# Patient Record
Sex: MALE | Race: WHITE
[De-identification: names, ages, dates, MRNs, and addresses within clinical notes are randomized per-mention and may not be internally consistent; named-entity substitution may affect disease eponyms.]

---

## 2017-05-12 ENCOUNTER — HOSPITAL ENCOUNTER (OUTPATIENT)
Dept: HOSPITAL 47 - RADUSWWP | Age: 57
Discharge: HOME | End: 2017-05-12
Payer: COMMERCIAL

## 2017-05-12 DIAGNOSIS — N63: Primary | ICD-10-CM

## 2017-06-10 ENCOUNTER — HOSPITAL ENCOUNTER (OUTPATIENT)
Dept: HOSPITAL 47 - LABWHC1 | Age: 57
Discharge: HOME | End: 2017-06-10
Payer: COMMERCIAL

## 2017-06-10 DIAGNOSIS — Z13.9: ICD-10-CM

## 2017-06-10 DIAGNOSIS — Z00.00: Primary | ICD-10-CM

## 2017-06-10 LAB
ALP SERPL-CCNC: 70 U/L (ref 38–126)
ALT SERPL-CCNC: 27 U/L (ref 21–72)
ANION GAP SERPL CALC-SCNC: 6 MMOL/L
AST SERPL-CCNC: 22 U/L (ref 17–59)
BUN SERPL-SCNC: 15 MG/DL (ref 9–20)
CALCIUM SPEC-MCNC: 9.6 MG/DL (ref 8.4–10.2)
CH: 33.6
CHCM: 34.3
CHLORIDE SERPL-SCNC: 106 MMOL/L (ref 98–107)
CHOLEST SERPL-MCNC: 139 MG/DL (ref ?–200)
CO2 SERPL-SCNC: 26 MMOL/L (ref 22–30)
ERYTHROCYTE [DISTWIDTH] IN BLOOD BY AUTOMATED COUNT: 5.06 M/UL (ref 4.3–5.9)
ERYTHROCYTE [DISTWIDTH] IN BLOOD: 13.3 % (ref 11.5–15.5)
GLUCOSE SERPL-MCNC: 92 MG/DL (ref 74–99)
HCT VFR BLD AUTO: 49.6 % (ref 39–53)
HDLC SERPL-MCNC: 39 MG/DL (ref 40–60)
HDW: 2.33
HEPATITIS C VIRUS IGG  INDEX: 0.02
HGB BLD-MCNC: 16.9 GM/DL (ref 13–17.5)
MCH RBC QN AUTO: 33.4 PG (ref 25–35)
MCHC RBC AUTO-ENTMCNC: 34 G/DL (ref 31–37)
MCV RBC AUTO: 98.1 FL (ref 80–100)
NON-AFRICAN AMERICAN GFR(MDRD): >60
POTASSIUM SERPL-SCNC: 4.7 MMOL/L (ref 3.5–5.1)
PROT SERPL-MCNC: 6.9 G/DL (ref 6.3–8.2)
PSA SERPL-MCNC: 0.67 NG/ML (ref 0–4)
SODIUM SERPL-SCNC: 138 MMOL/L (ref 137–145)
TRIGL SERPL-MCNC: 69 MG/DL (ref ?–150)
WBC # BLD AUTO: 6.2 K/UL (ref 3.8–10.6)

## 2017-06-10 PROCEDURE — 84153 ASSAY OF PSA TOTAL: CPT

## 2017-06-10 PROCEDURE — 36415 COLL VENOUS BLD VENIPUNCTURE: CPT

## 2017-06-10 PROCEDURE — 80053 COMPREHEN METABOLIC PANEL: CPT

## 2017-06-10 PROCEDURE — 80061 LIPID PANEL: CPT

## 2017-06-10 PROCEDURE — 85027 COMPLETE CBC AUTOMATED: CPT

## 2017-06-10 PROCEDURE — 86803 HEPATITIS C AB TEST: CPT

## 2019-04-11 NOTE — USB
Reason for exam: clinical finding.



History:

Family history of breast cancer in sister at age 59.

Indicated problem(s): lump or thickening in the right breast.



Physical Findings:

Nurse Summary: 1cm round movable nodules in the right breast at 12 o'clock, 0.5cm 

in the right breast at 11 o'clock (nurse mm).



US Breast RT

Right breast ultrasound includes all four quadrants, the retroareolar region and 

axilla. Finding demonstrates a 1.2 x 1.6 x 0.5cm oval, hyperechoic, vascular 

lesion at BB at 12 o'clock, a 0.9 x 0.5 x 0.4cm oval, hyperechoic, vascular lesion

at 11 o'clock and a 0.6 x 0.7 x 0.3cm oval, hyperechoic, vascular lesion at 11 

o'clock. Consistent with lipomas.



These results were verbally communicated with the patient and result sheet given 

to the patient on 5/12/17.





ASSESSMENT: Benign, BI-RAD 2



RECOMMENDATION:

Clinical management of the right breast.

Manage patient on a clinical basis.
Well-developed, well nourished

## 2022-05-05 ENCOUNTER — HOSPITAL ENCOUNTER (OUTPATIENT)
Dept: HOSPITAL 47 - RADMRIMAIN | Age: 62
Discharge: HOME | End: 2022-05-05
Attending: FAMILY MEDICINE
Payer: COMMERCIAL

## 2022-05-05 DIAGNOSIS — M43.17: Primary | ICD-10-CM

## 2022-05-05 DIAGNOSIS — M51.36: ICD-10-CM

## 2022-05-05 PROCEDURE — 72148 MRI LUMBAR SPINE W/O DYE: CPT

## 2022-05-05 NOTE — MR
EXAMINATION TYPE: MR lumbar spine wo con

 

DATE OF EXAM: 5/5/2022

 

COMPARISON: None

 

HISTORY: Low back pain into rt leg

 

TECHNIQUE: 

Multiplanar, multisequence images of the lumbar spine were acquired without IV contrast.

 

L1-L2: Mild posterior broad-based disc bulge causes slight anterior mass effect on the thecal sac

 

Short pedicles may contribute somewhat to cause some foraminal encroachment greater on the right.

 

L2-L3: Broad-based posterior disc bulge causes slight anterior mass effect on the thecal sac. No defi
nite foraminal encroachment.

 

L3-L4: Posterior broad-based disc bulge causes minimal anterior mass effect on the thecal sac, circum
ference extension endplate disc complex causes some foraminal encroachment greater on the right at th
e inferior aspect, there is facet arthropathy with hypertrophy of the ligamentum flavum causing some 
posterior lateral aspect of the thecal sac more so on the right

 

L4-L5: Posterior broad-based disc bulge causes mild mass effect on the thecal sac. Facet arthropathy 
changes present. Circumferential extension of endplates is complex encroaches on the inferior aspect 
of the foramen greater on the left and right

 

L5-S1: Listhesis contributes to cause bilateral foraminal encroachment. No evident disc herniation. L
isthesis uncovers the disc material at L5-S1, there is some facet arthropathy changes.

 

Lumbar segments are intact.  No paraspinal masses are identified.  Conus medullaris has a normal appe
arance. There is multilevel spondylosis. Lumbar vertebral bodies show preserved height, is an anterol
isthesis grade 1 L5-S1, bilateral spondylolysis at L5. Loss of disc height and signal is greatest at 
L5-S1 but also present at L4-5 greater than L2-3 and L1-L2. No significant spinal stenosis.

 

IMPRESSION:

Spondylolysis L5 with spondylolisthesis L5-S1, bilateral foraminal encroachment, most multilevel dege
nerative disc disease.